# Patient Record
Sex: MALE | Race: WHITE | NOT HISPANIC OR LATINO | Employment: OTHER | URBAN - METROPOLITAN AREA
[De-identification: names, ages, dates, MRNs, and addresses within clinical notes are randomized per-mention and may not be internally consistent; named-entity substitution may affect disease eponyms.]

---

## 2017-06-04 ENCOUNTER — ANESTHESIA EVENT (OUTPATIENT)
Dept: GASTROENTEROLOGY | Facility: HOSPITAL | Age: 67
End: 2017-06-04

## 2017-06-05 ENCOUNTER — ANESTHESIA (OUTPATIENT)
Dept: GASTROENTEROLOGY | Facility: HOSPITAL | Age: 67
End: 2017-06-05

## 2017-07-10 ENCOUNTER — ANESTHESIA EVENT (OUTPATIENT)
Dept: GASTROENTEROLOGY | Facility: HOSPITAL | Age: 67
End: 2017-07-10
Payer: COMMERCIAL

## 2017-07-11 ENCOUNTER — ANESTHESIA (OUTPATIENT)
Dept: GASTROENTEROLOGY | Facility: HOSPITAL | Age: 67
End: 2017-07-11
Payer: COMMERCIAL

## 2017-07-11 ENCOUNTER — HOSPITAL ENCOUNTER (OUTPATIENT)
Facility: HOSPITAL | Age: 67
Setting detail: OUTPATIENT SURGERY
Discharge: HOME | End: 2017-07-11
Attending: INTERNAL MEDICINE | Admitting: INTERNAL MEDICINE
Payer: COMMERCIAL

## 2017-07-11 VITALS
RESPIRATION RATE: 20 BRPM | DIASTOLIC BLOOD PRESSURE: 72 MMHG | OXYGEN SATURATION: 96 % | SYSTOLIC BLOOD PRESSURE: 117 MMHG | BODY MASS INDEX: 29.23 KG/M2 | HEIGHT: 63 IN | HEART RATE: 76 BPM | TEMPERATURE: 96.6 F | WEIGHT: 165 LBS

## 2017-07-11 DIAGNOSIS — K22.70 BARRETT'S ESOPHAGUS WITHOUT DYSPLASIA: ICD-10-CM

## 2017-07-11 DIAGNOSIS — K21.9 GASTRO-ESOPHAGEAL REFLUX DISEASE WITHOUT ESOPHAGITIS: ICD-10-CM

## 2017-07-11 PROCEDURE — 88305 TISSUE EXAM BY PATHOLOGIST: CPT | Performed by: INTERNAL MEDICINE

## 2017-07-11 RX ORDER — SODIUM CHLORIDE 9 MG/ML
125 INJECTION, SOLUTION INTRAVENOUS CONTINUOUS
Status: DISCONTINUED | OUTPATIENT
Start: 2017-07-11 | End: 2017-07-11 | Stop reason: HOSPADM

## 2017-07-11 RX ORDER — PROPOFOL 10 MG/ML
INJECTION, EMULSION INTRAVENOUS AS NEEDED
Status: DISCONTINUED | OUTPATIENT
Start: 2017-07-11 | End: 2017-07-11 | Stop reason: SURG

## 2017-07-11 RX ADMIN — PROPOFOL 20 MG: 10 INJECTION, EMULSION INTRAVENOUS at 14:08

## 2017-07-11 RX ADMIN — PROPOFOL 50 MG: 10 INJECTION, EMULSION INTRAVENOUS at 14:07

## 2017-07-11 RX ADMIN — SODIUM CHLORIDE 125 ML/HR: 0.9 INJECTION, SOLUTION INTRAVENOUS at 13:56

## 2017-07-11 RX ADMIN — PROPOFOL 100 MG: 10 INJECTION, EMULSION INTRAVENOUS at 14:05

## 2017-07-11 RX ADMIN — PROPOFOL 10 MG: 10 INJECTION, EMULSION INTRAVENOUS at 14:09

## 2017-07-11 RX ADMIN — PROPOFOL 10 MG: 10 INJECTION, EMULSION INTRAVENOUS at 14:12

## 2019-06-10 ENCOUNTER — ANESTHESIA EVENT (OUTPATIENT)
Dept: GASTROENTEROLOGY | Facility: HOSPITAL | Age: 69
End: 2019-06-10

## 2019-06-11 ENCOUNTER — ANESTHESIA (OUTPATIENT)
Dept: GASTROENTEROLOGY | Facility: HOSPITAL | Age: 69
End: 2019-06-11

## 2019-06-11 ENCOUNTER — HOSPITAL ENCOUNTER (OUTPATIENT)
Dept: GASTROENTEROLOGY | Facility: HOSPITAL | Age: 69
Setting detail: OUTPATIENT SURGERY
Discharge: HOME | End: 2019-06-11
Attending: INTERNAL MEDICINE | Admitting: INTERNAL MEDICINE
Payer: MEDICARE

## 2019-06-11 VITALS
DIASTOLIC BLOOD PRESSURE: 58 MMHG | BODY MASS INDEX: 25.52 KG/M2 | HEIGHT: 63 IN | TEMPERATURE: 97.6 F | RESPIRATION RATE: 13 BRPM | HEART RATE: 45 BPM | SYSTOLIC BLOOD PRESSURE: 101 MMHG | OXYGEN SATURATION: 98 % | WEIGHT: 144 LBS

## 2019-06-11 DIAGNOSIS — F41.1 GENERALIZED ANXIETY DISORDER: ICD-10-CM

## 2019-06-11 DIAGNOSIS — K22.70 BARRETT'S ESOPHAGUS WITHOUT DYSPLASIA: ICD-10-CM

## 2019-06-11 DIAGNOSIS — K44.9 DIAPHRAGMATIC HERNIA WITHOUT OBSTRUCTION OR GANGRENE: ICD-10-CM

## 2019-06-11 DIAGNOSIS — R13.10 DYSPHAGIA: ICD-10-CM

## 2019-06-11 PROCEDURE — 88305 TISSUE EXAM BY PATHOLOGIST: CPT | Performed by: PATHOLOGY

## 2019-06-11 PROCEDURE — 88342 IMHCHEM/IMCYTCHM 1ST ANTB: CPT | Performed by: PATHOLOGY

## 2019-06-11 RX ORDER — SODIUM CHLORIDE 9 MG/ML
125 INJECTION, SOLUTION INTRAVENOUS CONTINUOUS
Status: DISCONTINUED | OUTPATIENT
Start: 2019-06-11 | End: 2019-06-15 | Stop reason: HOSPADM

## 2019-06-11 RX ORDER — PROPOFOL 10 MG/ML
INJECTION, EMULSION INTRAVENOUS AS NEEDED
Status: DISCONTINUED | OUTPATIENT
Start: 2019-06-11 | End: 2019-06-11 | Stop reason: SURG

## 2019-06-11 RX ADMIN — PROPOFOL 100 MG: 10 INJECTION, EMULSION INTRAVENOUS at 14:08

## 2019-06-11 RX ADMIN — PROPOFOL 30 MG: 10 INJECTION, EMULSION INTRAVENOUS at 14:13

## 2019-06-11 RX ADMIN — SODIUM CHLORIDE 125 ML/HR: 0.9 INJECTION, SOLUTION INTRAVENOUS at 13:39

## 2019-06-11 RX ADMIN — PROPOFOL 50 MG: 10 INJECTION, EMULSION INTRAVENOUS at 14:09

## 2019-06-11 RX ADMIN — LIDOCAINE HYDROCHLORIDE 100 MG: 20 INJECTION, SOLUTION INTRAVENOUS at 14:08

## 2019-06-11 RX ADMIN — PROPOFOL 20 MG: 10 INJECTION, EMULSION INTRAVENOUS at 14:11

## 2023-03-16 RX ORDER — SODIUM CHLORIDE 9 MG/ML
125 INJECTION, SOLUTION INTRAVENOUS CONTINUOUS
Status: CANCELLED | OUTPATIENT
Start: 2023-03-16

## 2023-03-17 ENCOUNTER — ANESTHESIA (OUTPATIENT)
Dept: GASTROENTEROLOGY | Facility: HOSPITAL | Age: 73
End: 2023-03-17

## 2023-03-17 ENCOUNTER — ANESTHESIA EVENT (OUTPATIENT)
Dept: GASTROENTEROLOGY | Facility: HOSPITAL | Age: 73
End: 2023-03-17

## 2023-03-17 ENCOUNTER — HOSPITAL ENCOUNTER (OUTPATIENT)
Dept: GASTROENTEROLOGY | Facility: HOSPITAL | Age: 73
Setting detail: OUTPATIENT SURGERY
End: 2023-03-17
Attending: INTERNAL MEDICINE

## 2023-03-17 VITALS
WEIGHT: 177 LBS | TEMPERATURE: 97.8 F | SYSTOLIC BLOOD PRESSURE: 106 MMHG | HEIGHT: 63 IN | DIASTOLIC BLOOD PRESSURE: 68 MMHG | OXYGEN SATURATION: 96 % | BODY MASS INDEX: 31.36 KG/M2 | RESPIRATION RATE: 15 BRPM | HEART RATE: 75 BPM

## 2023-03-17 DIAGNOSIS — K22.70 BARRETT'S ESOPHAGUS WITHOUT DYSPLASIA: ICD-10-CM

## 2023-03-17 DIAGNOSIS — K21.9 GASTROESOPHAGEAL REFLUX DISEASE WITHOUT ESOPHAGITIS: ICD-10-CM

## 2023-03-17 LAB — GLUCOSE SERPL-MCNC: 157 MG/DL (ref 65–140)

## 2023-03-17 RX ORDER — LIDOCAINE HYDROCHLORIDE 20 MG/ML
INJECTION, SOLUTION EPIDURAL; INFILTRATION; INTRACAUDAL; PERINEURAL AS NEEDED
Status: DISCONTINUED | OUTPATIENT
Start: 2023-03-17 | End: 2023-03-17

## 2023-03-17 RX ORDER — PROPOFOL 10 MG/ML
INJECTION, EMULSION INTRAVENOUS AS NEEDED
Status: DISCONTINUED | OUTPATIENT
Start: 2023-03-17 | End: 2023-03-17

## 2023-03-17 RX ORDER — SODIUM CHLORIDE 9 MG/ML
125 INJECTION, SOLUTION INTRAVENOUS CONTINUOUS
Status: DISCONTINUED | OUTPATIENT
Start: 2023-03-17 | End: 2023-03-21 | Stop reason: HOSPADM

## 2023-03-17 RX ORDER — GLYCOPYRROLATE 0.2 MG/ML
INJECTION INTRAMUSCULAR; INTRAVENOUS AS NEEDED
Status: DISCONTINUED | OUTPATIENT
Start: 2023-03-17 | End: 2023-03-17

## 2023-03-17 RX ORDER — SODIUM CHLORIDE 9 MG/ML
INJECTION, SOLUTION INTRAVENOUS CONTINUOUS PRN
Status: DISCONTINUED | OUTPATIENT
Start: 2023-03-17 | End: 2023-03-17

## 2023-03-17 RX ADMIN — PROPOFOL 120 MG: 10 INJECTION, EMULSION INTRAVENOUS at 08:08

## 2023-03-17 RX ADMIN — SODIUM CHLORIDE: 0.9 INJECTION, SOLUTION INTRAVENOUS at 07:55

## 2023-03-17 RX ADMIN — SODIUM CHLORIDE 125 ML/HR: 0.9 INJECTION, SOLUTION INTRAVENOUS at 08:01

## 2023-03-17 RX ADMIN — PROPOFOL 30 MG: 10 INJECTION, EMULSION INTRAVENOUS at 08:13

## 2023-03-17 RX ADMIN — GLYCOPYRROLATE 0.2 MG: 0.2 INJECTION INTRAMUSCULAR; INTRAVENOUS at 08:06

## 2023-03-17 RX ADMIN — LIDOCAINE HYDROCHLORIDE 60 MG: 20 INJECTION, SOLUTION EPIDURAL; INFILTRATION; INTRACAUDAL; PERINEURAL at 08:08

## 2023-03-17 RX ADMIN — PROPOFOL 50 MG: 10 INJECTION, EMULSION INTRAVENOUS at 08:10

## 2023-03-17 NOTE — ANESTHESIA PREPROCEDURE EVALUATION
Procedure:  EGD    Relevant Problems   GI/HEPATIC   (+) Dysphagia   (+) GERD (gastroesophageal reflux disease)      Digestive   (+) Barretts esophagus        Physical Exam    Airway    Mallampati score: II  TM Distance: >3 FB  Neck ROM: full     Dental   No notable dental hx     Cardiovascular  Rhythm: regular, Rate: normal, Cardiovascular exam normal    Pulmonary  Pulmonary exam normal Breath sounds clear to auscultation,     Other Findings        Anesthesia Plan  ASA Score- 2     Anesthesia Type- IV sedation with anesthesia with ASA Monitors  Additional Monitors:   Airway Plan:     Comment: GA prn  Plan Factors-    Patient summary reviewed  Patient is not a current smoker  Patient not instructed to abstain from smoking on day of procedure  Patient did not smoke on day of surgery  There is medical exclusion for perioperative obstructive sleep apnea risk education  Induction- intravenous  Postoperative Plan-     Informed Consent- Anesthetic plan and risks discussed with patient and spouse Yonas Sifuentes)  I personally reviewed this patient with the CRNA  Discussed and agreed on the Anesthesia Plan with the CRNA  Adryan Verma

## 2023-03-17 NOTE — DISCHARGE INSTR - AVS FIRST PAGE
Please call [phone] with any problems  Continue on your acid suppression  We will call you with hopefully benign pathology  I have suggested repeat EGD in 1 year for your Markham's

## 2023-03-17 NOTE — ANESTHESIA POSTPROCEDURE EVALUATION
Post-Op Assessment Note    CV Status:  Stable    Pain management: adequate     Mental Status:  Alert and awake   Hydration Status:  Euvolemic   PONV Controlled:  Controlled   Airway Patency:  Patent      Post Op Vitals Reviewed: Yes      Staff: Anesthesiologist, CRNA         No notable events documented      BP      Temp      Pulse     Resp      SpO2      /68   Pulse 75   Temp 97 8 °F (36 6 °C) (Temporal)   Resp 15   Ht 5' 3" (1 6 m)   Wt 80 3 kg (177 lb)   SpO2 96%   BMI 31 35 kg/m²

## 2023-03-17 NOTE — INTERVAL H&P NOTE
H&P reviewed  After examining the patient I find no changes in the patients condition since the H&P had been written    No dysphagia  Vitals:    03/17/23 0757   BP: 132/78   Pulse: 57   Resp: 16   Temp: 97 8 °F (36 6 °C)   SpO2: 97%

## 2024-10-15 PROBLEM — N40.1 BENIGN PROSTATIC HYPERPLASIA WITH URINARY FREQUENCY: Status: ACTIVE | Noted: 2020-11-25

## 2024-10-15 PROBLEM — Z86.11 HISTORY OF TB (TUBERCULOSIS): Status: ACTIVE | Noted: 2024-10-15

## 2024-10-15 PROBLEM — I70.0 HARDENING OF THE AORTA (MAIN ARTERY OF THE HEART) (HCC): Status: ACTIVE | Noted: 2023-10-11

## 2024-10-15 PROBLEM — R35.0 BENIGN PROSTATIC HYPERPLASIA WITH URINARY FREQUENCY: Status: ACTIVE | Noted: 2020-11-25

## 2024-10-15 PROBLEM — K76.0 FATTY LIVER: Status: ACTIVE | Noted: 2018-03-20

## 2024-10-15 PROBLEM — E11.9 CONTROLLED TYPE 2 DIABETES MELLITUS WITHOUT COMPLICATION, WITHOUT LONG-TERM CURRENT USE OF INSULIN (HCC): Status: ACTIVE | Noted: 2022-04-07

## 2024-10-15 PROBLEM — E04.1 NONTOXIC UNINODULAR GOITER: Status: ACTIVE | Noted: 2019-10-31

## 2024-11-20 RX ORDER — SODIUM CHLORIDE, SODIUM LACTATE, POTASSIUM CHLORIDE, CALCIUM CHLORIDE 600; 310; 30; 20 MG/100ML; MG/100ML; MG/100ML; MG/100ML
125 INJECTION, SOLUTION INTRAVENOUS CONTINUOUS
Status: CANCELLED | OUTPATIENT
Start: 2024-11-20

## 2024-11-21 ENCOUNTER — ANESTHESIA (OUTPATIENT)
Dept: GASTROENTEROLOGY | Facility: HOSPITAL | Age: 74
End: 2024-11-21
Payer: MEDICARE

## 2024-11-21 ENCOUNTER — ANESTHESIA EVENT (OUTPATIENT)
Dept: GASTROENTEROLOGY | Facility: HOSPITAL | Age: 74
End: 2024-11-21
Payer: MEDICARE

## 2024-11-21 ENCOUNTER — HOSPITAL ENCOUNTER (OUTPATIENT)
Dept: GASTROENTEROLOGY | Facility: HOSPITAL | Age: 74
Setting detail: OUTPATIENT SURGERY
End: 2024-11-21
Attending: INTERNAL MEDICINE
Payer: MEDICARE

## 2024-11-21 VITALS
HEIGHT: 63 IN | OXYGEN SATURATION: 95 % | BODY MASS INDEX: 31.01 KG/M2 | SYSTOLIC BLOOD PRESSURE: 133 MMHG | RESPIRATION RATE: 20 BRPM | DIASTOLIC BLOOD PRESSURE: 87 MMHG | WEIGHT: 175 LBS | TEMPERATURE: 97.4 F | HEART RATE: 59 BPM

## 2024-11-21 DIAGNOSIS — K22.70 LONG-SEGMENT BARRETT'S ESOPHAGUS: ICD-10-CM

## 2024-11-21 DIAGNOSIS — E11.9 CONTROLLED TYPE 2 DIABETES MELLITUS WITHOUT COMPLICATION, WITHOUT LONG-TERM CURRENT USE OF INSULIN (HCC): ICD-10-CM

## 2024-11-21 DIAGNOSIS — K21.9 GASTROESOPHAGEAL REFLUX DISEASE, UNSPECIFIED WHETHER ESOPHAGITIS PRESENT: ICD-10-CM

## 2024-11-21 DIAGNOSIS — K44.9 HIATAL HERNIA: ICD-10-CM

## 2024-11-21 LAB — GLUCOSE SERPL-MCNC: 161 MG/DL (ref 65–140)

## 2024-11-21 PROCEDURE — 88305 TISSUE EXAM BY PATHOLOGIST: CPT | Performed by: PATHOLOGY

## 2024-11-21 PROCEDURE — 88360 TUMOR IMMUNOHISTOCHEM/MANUAL: CPT | Performed by: PATHOLOGY

## 2024-11-21 PROCEDURE — 82948 REAGENT STRIP/BLOOD GLUCOSE: CPT

## 2024-11-21 PROCEDURE — 88342 IMHCHEM/IMCYTCHM 1ST ANTB: CPT | Performed by: PATHOLOGY

## 2024-11-21 RX ORDER — SODIUM CHLORIDE, SODIUM LACTATE, POTASSIUM CHLORIDE, CALCIUM CHLORIDE 600; 310; 30; 20 MG/100ML; MG/100ML; MG/100ML; MG/100ML
125 INJECTION, SOLUTION INTRAVENOUS CONTINUOUS
Status: DISPENSED | OUTPATIENT
Start: 2024-11-21

## 2024-11-21 RX ORDER — LIDOCAINE HYDROCHLORIDE 10 MG/ML
INJECTION, SOLUTION EPIDURAL; INFILTRATION; INTRACAUDAL; PERINEURAL AS NEEDED
Status: DISCONTINUED | OUTPATIENT
Start: 2024-11-21 | End: 2024-11-21

## 2024-11-21 RX ORDER — PROPOFOL 10 MG/ML
INJECTION, EMULSION INTRAVENOUS AS NEEDED
Status: DISCONTINUED | OUTPATIENT
Start: 2024-11-21 | End: 2024-11-21

## 2024-11-21 RX ADMIN — PROPOFOL 100 MG: 10 INJECTION, EMULSION INTRAVENOUS at 09:29

## 2024-11-21 RX ADMIN — PROPOFOL 20 MG: 10 INJECTION, EMULSION INTRAVENOUS at 09:30

## 2024-11-21 RX ADMIN — PROPOFOL 20 MG: 10 INJECTION, EMULSION INTRAVENOUS at 09:34

## 2024-11-21 RX ADMIN — LIDOCAINE HYDROCHLORIDE 100 MG: 10 SOLUTION INTRAVENOUS at 09:28

## 2024-11-21 RX ADMIN — SODIUM CHLORIDE, SODIUM LACTATE, POTASSIUM CHLORIDE, AND CALCIUM CHLORIDE 125 ML/HR: .6; .31; .03; .02 INJECTION, SOLUTION INTRAVENOUS at 08:41

## 2024-11-21 RX ADMIN — PROPOFOL 20 MG: 10 INJECTION, EMULSION INTRAVENOUS at 09:32

## 2024-11-21 NOTE — DISCHARGE INSTR - AVS FIRST PAGE
Please call [phone] with any problems.  I did do multiple biopsies.  There was nothing suspicious seen.  I would suggest screening in 2 years for your Markham's.  Please continue your PPI

## 2024-11-21 NOTE — ANESTHESIA POSTPROCEDURE EVALUATION
Post-Op Assessment Note    CV Status:  Stable  Pain Score: 0    Pain management: adequate       Mental Status:  Alert and awake   Hydration Status:  Euvolemic   PONV Controlled:  Controlled   Airway Patency:  Patent     Post Op Vitals Reviewed: Yes    No anethesia notable event occurred.    Staff: CRNA           Last Filed PACU Vitals:  Vitals Value Taken Time   Temp 97.4 °F (36.3 °C) 11/21/24 0944   Pulse 60 11/21/24 0944   /79 11/21/24 0944   Resp 20 11/21/24 0944   SpO2 94 % 11/21/24 0944       Modified Golden:  No data recorded

## 2024-11-21 NOTE — INTERVAL H&P NOTE
H&P reviewed. After examining the patient I find no changes in the patients condition since the H&P had been written.    Vitals:    11/21/24 0844   BP: 146/86   Pulse:    Resp:    Temp:    SpO2:

## 2024-11-21 NOTE — ANESTHESIA PREPROCEDURE EVALUATION
Procedure:  EGD    Relevant Problems   CARDIO   (+) Cerebral atherosclerosis   (+) Hardening of the aorta (main artery of the heart) (HCC)   (+) Hyperlipidemia      ENDO   (+) Controlled type 2 diabetes mellitus without complication, without long-term current use of insulin (HCC)      GI/HEPATIC   (+) Dysphagia   (+) Fatty liver   (+) GERD (gastroesophageal reflux disease)      Endocrine   (+) Nontoxic uninodular goiter      FEN/Gastrointestinal   (+) Barretts esophagus      Other   (+) Benign prostatic hyperplasia with urinary frequency   (+) History of TB (tuberculosis)        Physical Exam    Airway    Mallampati score: II  TM Distance: <3 FB  Neck ROM: full     Dental   No notable dental hx     Cardiovascular  Cardiovascular exam normal    Pulmonary  Pulmonary exam normal     Other Findings        Anesthesia Plan  ASA Score- 2     Anesthesia Type- IV sedation with anesthesia with ASA Monitors.         Additional Monitors:     Airway Plan:            Plan Factors-Exercise tolerance (METS): >4 METS.    Chart reviewed.   Existing labs reviewed.     Patient is not a current smoker. Patient not instructed to abstain from smoking on day of procedure. Patient did not smoke on day of surgery.            Induction- intravenous.    Postoperative Plan-         Informed Consent- Anesthetic plan and risks discussed with patient.  I personally reviewed this patient with the CRNA. Discussed and agreed on the Anesthesia Plan with the CRNA..

## 2024-11-26 PROCEDURE — 88360 TUMOR IMMUNOHISTOCHEM/MANUAL: CPT | Performed by: PATHOLOGY

## 2024-11-26 PROCEDURE — 88305 TISSUE EXAM BY PATHOLOGIST: CPT | Performed by: PATHOLOGY

## 2024-11-26 PROCEDURE — 88342 IMHCHEM/IMCYTCHM 1ST ANTB: CPT | Performed by: PATHOLOGY

## 2025-02-27 ENCOUNTER — NEW PATIENT (OUTPATIENT)
Dept: URBAN - METROPOLITAN AREA CLINIC 6 | Facility: CLINIC | Age: 75
End: 2025-02-27

## 2025-02-27 DIAGNOSIS — H52.203: ICD-10-CM

## 2025-02-27 DIAGNOSIS — H52.13: ICD-10-CM

## 2025-02-27 DIAGNOSIS — E11.9: ICD-10-CM

## 2025-02-27 DIAGNOSIS — H25.813: ICD-10-CM

## 2025-02-27 DIAGNOSIS — H52.4: ICD-10-CM

## 2025-02-27 DIAGNOSIS — H53.022: ICD-10-CM

## 2025-02-27 PROCEDURE — 92004 COMPRE OPH EXAM NEW PT 1/>: CPT

## 2025-02-27 PROCEDURE — 92015 DETERMINE REFRACTIVE STATE: CPT

## 2025-02-27 ASSESSMENT — VISUAL ACUITY
OS_CC: 20/150
OD_CC: 20/25
OS_PH: 20/100

## 2025-02-27 ASSESSMENT — TONOMETRY
OS_IOP_MMHG: 12
OD_IOP_MMHG: 14

## 2025-04-14 ENCOUNTER — CONSULT (OUTPATIENT)
Dept: NEPHROLOGY | Facility: CLINIC | Age: 75
End: 2025-04-14
Payer: MEDICARE

## 2025-04-14 VITALS
BODY MASS INDEX: 31.01 KG/M2 | SYSTOLIC BLOOD PRESSURE: 128 MMHG | WEIGHT: 175 LBS | DIASTOLIC BLOOD PRESSURE: 80 MMHG | HEIGHT: 63 IN | OXYGEN SATURATION: 95 % | HEART RATE: 74 BPM

## 2025-04-14 DIAGNOSIS — E83.9 CHRONIC KIDNEY DISEASE-MINERAL AND BONE DISORDER: ICD-10-CM

## 2025-04-14 DIAGNOSIS — N18.31 STAGE 3A CHRONIC KIDNEY DISEASE (HCC): Primary | ICD-10-CM

## 2025-04-14 DIAGNOSIS — N18.9 CHRONIC KIDNEY DISEASE-MINERAL AND BONE DISORDER: ICD-10-CM

## 2025-04-14 DIAGNOSIS — N18.2 TYPE 2 DIABETES MELLITUS WITH STAGE 2 CHRONIC KIDNEY DISEASE, WITHOUT LONG-TERM CURRENT USE OF INSULIN  (HCC): ICD-10-CM

## 2025-04-14 DIAGNOSIS — E11.22 TYPE 2 DIABETES MELLITUS WITH STAGE 2 CHRONIC KIDNEY DISEASE, WITHOUT LONG-TERM CURRENT USE OF INSULIN  (HCC): ICD-10-CM

## 2025-04-14 DIAGNOSIS — M89.9 CHRONIC KIDNEY DISEASE-MINERAL AND BONE DISORDER: ICD-10-CM

## 2025-04-14 PROCEDURE — 99204 OFFICE O/P NEW MOD 45 MIN: CPT | Performed by: INTERNAL MEDICINE

## 2025-04-14 RX ORDER — LISINOPRIL 2.5 MG/1
2.5 TABLET ORAL DAILY
COMMUNITY

## 2025-04-14 NOTE — PROGRESS NOTES
NEPHROLOGY OUTPATIENT CONSULTATION   Rodolfo Mendez 74 y.o. male MRN: [id number]  Date: 04/14/25  Reason for consultation: Initial evaluation of CKD    ASSESSMENT and PLAN:  Chronic kidney disease, stage III  Baseline creatinine was around 1.1-1.2.    Since starting lisinopril in April 2024, Rodolfo's creatinine has been around 1.3. The slight rise in the creatinine is likely due to the hemodynamic effect of the lisinopril.  He has no proteinuria which bodes for a good renal prognosis.  Given his history of diabetes, I recommend staying on lisinopril 2.5 mg daily.  To complete the workup, we will check a renal ultrasound, UA, and urine ACR.   He has a history of transient hypercalcemia on labs. Check SPEP, UPEP, and free light chains for completeness.     Mineral and bone disease:  Check PTH, Vitamin D and phos.     Diabetes mellitus  DM is being managed by PCP.  He is on metformin.     Patient Instructions   Your creatinine indicates that you have either stage II or stage III kidney disease.   However, your kidney function has been relatively stable since 2022.   Please check a kidney ultrasound.   I recommend no changes to your medications today.   The Lisinopril is good for kidney protection.   Follow up in 6 months - please obtain blood work 1-2 weeks prior to the appointment.   Please avoid taking NSAIDs (Ibuprofen, Motrin, Aleve, Advil, Naproxen, Celebrex, etc.)  Make sure you are eating healthy and have adequate exercise.     HISTORY OF PRESENT ILLNESS:  Requesting Physician: Elizabeth Son MD    Rodolfo Mendez is a 74 y.o. male who was referred for initial evaluation of an elevated creatinine.    Rodolfo has a history of diabetes mellitus, hyperlipidemia, Markham's esophagus, osteoarthritis, BPH, thyroid nodules and a positive PPD test.  He denies a history of hypertension or cardiac issues.  He is being referred for evaluation of an elevated creatinine.    Based on my review of the records, I note that Rodolfo's  "creatinine is anywhere between 1.0 and 1.4 based on blood work from March 2018 to March 2025.  His creatinine had been around 1.0-1.2 for the most part prior to July 2022. It seems that he was started on Losartan 25 mg daily in April 2022 because of an abnormal stress test. His creatinine in July and Sep 2022 were 1.39 and 1.32, respectively.  He self discontinued the losartan 1 year after starting it and his creatinine in 2023 to April 2024 was between 1.12 and 1.19.  He was started on lisinopril 2.5 mg daily for renal protection during a regular office visit with his PCP in April 2024.  Routine labs in February 2025 revealed a creatinine of 1.36 and repeat labs in March 2025 revealed a creatinine 1.38.  Due to the elevated creatinine, a renal consultation was requested.    His BP control has been at goal.   No history of gross hematuria.   He recalls taking pain medications (does not know the name) about 3 times a week for a few years before having bilateral knee replacements in 2009.     PAST MEDICAL HISTORY:  DM: Diagnosed about >3 years ago.  He is currently on metformin.  He is not insulin dependent. No known diabetic retinopathy.   HLP: Not on statins.   Markham's esophagus: He has been on a PPI for roughly 20 years.  Osteoarthritis: s/p bilateral knee replacement, right hip replacement.  BPH  Thyroid nodules.   PPD positive: Never required TB treatment.     No known history of DM, CAD, CHF, CVA, PAD, COPD, SIERRA, asthma, thromboembolism, liver disease, nephrolithiasis, malignancy, psychiatric disease.    PAST SURGICAL HISTORY:  Bilateral knee replacement.   R hip replacement.   3 rotator cuff surgery  Bilateral carpal tunnel release.  Sebaceous cyst excision    ALLERGIES:  Allergies   Allergen Reactions    Amoxicillin Other (See Comments)     Joint pain  \"knee joint pain\"     SOCIAL HISTORY:  Non smoker  Social alcohol use - wine twice a month.   No IVDA.     FAMILY HISTORY:  Negative for CKD or ESRD. "     MEDICATIONS:    Current Outpatient Medications:     Cholecalciferol (VITAMIN D-3 PO), Take 125 mcg by mouth in the morning, Disp: , Rfl:     clindamycin (CLEOCIN) 300 MG capsule, AS NEEDED FOR DENTAL PROCEDURES, Disp: , Rfl:     lisinopril (ZESTRIL) 2.5 mg tablet, Take 2.5 mg by mouth daily, Disp: , Rfl:     metFORMIN (GLUCOPHAGE) 500 mg tablet, Take 500 mg by mouth 2 (two) times a day with meals, Disp: , Rfl:     multivitamin (THERAGRAN) TABS, Take 1 tablet by mouth daily., Disp: , Rfl:     pantoprazole (PROTONIX) 20 mg tablet, Take 1 tablet (20 mg total) by mouth 2 (two) times a day, Disp: 180 tablet, Rfl: 1    tadalafil (CIALIS) 5 MG tablet, Take 5 mg by mouth daily, Disp: , Rfl:     atorvastatin (LIPITOR) 40 mg tablet, Take 40 mg by mouth every evening (Patient not taking: Reported on 10/15/2024), Disp: , Rfl:     losartan (COZAAR) 25 mg tablet, Take 25 mg by mouth daily (Patient not taking: Reported on 10/15/2024), Disp: , Rfl:     REVIEW OF SYSTEMS:  Review of Systems   Constitutional:  Positive for fatigue. Negative for appetite change, chills and fever.   HENT:  Negative for hearing loss, sinus pressure and sinus pain.    Eyes:  Positive for visual disturbance.   Respiratory:  Negative for cough and shortness of breath.    Cardiovascular:  Negative for chest pain and leg swelling.   Gastrointestinal:  Positive for diarrhea (occasional - twice a month.). Negative for abdominal pain, nausea and vomiting.   Genitourinary:  Negative for difficulty urinating, dysuria and hematuria.   Musculoskeletal:  Positive for arthralgias and back pain (on and off depending on activity.).   Skin:  Negative for rash.   Allergic/Immunologic: Negative for immunocompromised state.   Neurological:  Negative for dizziness and light-headedness.   Hematological:  Does not bruise/bleed easily.   Psychiatric/Behavioral:  Negative for dysphoric mood. The patient is not nervous/anxious.    All the systems were reviewed and were  "negative except as documented on the HPI.    PHYSICAL EXAMINATION:  /80 (BP Location: Left arm, Patient Position: Sitting, Cuff Size: Standard)   Pulse 74   Ht 5' 3\" (1.6 m)   Wt 79.4 kg (175 lb)   SpO2 95%   BMI 31.00 kg/m²   Current Weight: Weight - Scale: 79.4 kg (175 lb) Body mass index is 31 kg/m².  General: conscious, coherent, cooperative, not in distress.   Skin: warm, dry, good turgor.   Eyes: pink conjunctivae, no scleral icterus.   ENT: moist lips and mucous membranes.   Respiratory: equal chest expansion, clear breath sounds.   Cardiovascular: distinct heart sounds, normal rate, regular rhythm, no rub  Abdomen: soft, non-tender, non-distended, normoactive bowel sounds  Extremities: no edema.  Genitourinary: no esparza catheter.   Neuro: awake, alert, oriented to time, place and person.   Psych: appropriate affect.     LABORATORY RESULTS:  Lab Results   Component Value Date    SODIUM 141 03/07/2025    K 4.8 03/07/2025     03/07/2025    CO2 26 03/07/2025    BUN 20 03/07/2025    CREATININE 1.38 (H) 03/07/2025    GLUC 121 (H) 03/07/2025    CALCIUM 10.2 03/07/2025     IMAGING: none.   "

## 2025-04-14 NOTE — PATIENT INSTRUCTIONS
Your creatinine indicates that you have either stage II or stage III kidney disease.   However, your kidney function has been relatively stable since 2022.   Please check a kidney ultrasound.   I recommend no changes to your medications today.   The Lisinopril is good for kidney protection.   Follow up in 6 months - please obtain blood work 1-2 weeks prior to the appointment.   Please avoid taking NSAIDs (Ibuprofen, Motrin, Aleve, Advil, Naproxen, Celebrex, etc.)  Make sure you are eating healthy and have adequate exercise.

## 2025-04-30 ENCOUNTER — TELEPHONE (OUTPATIENT)
Dept: NEPHROLOGY | Facility: CLINIC | Age: 75
End: 2025-04-30

## 2025-04-30 NOTE — TELEPHONE ENCOUNTER
I called the patient and left a message on voicemail.   Recent renal US reviewed.     No hydronephrosis  Mildly complex bilateral renal cysts  PVR 34 cc  Prominent prostate    Plan:  Repeat renal US to monitor renal cysts in 12 months.   Will order repeat renal US during next office visit.

## 2025-06-05 ENCOUNTER — ANESTHESIA EVENT (OUTPATIENT)
Dept: GASTROENTEROLOGY | Facility: HOSPITAL | Age: 75
End: 2025-06-05
Payer: MEDICARE

## 2025-06-05 ENCOUNTER — ANESTHESIA (OUTPATIENT)
Dept: GASTROENTEROLOGY | Facility: HOSPITAL | Age: 75
End: 2025-06-05
Payer: MEDICARE

## 2025-06-05 ENCOUNTER — HOSPITAL ENCOUNTER (OUTPATIENT)
Dept: GASTROENTEROLOGY | Facility: HOSPITAL | Age: 75
Setting detail: OUTPATIENT SURGERY
End: 2025-06-05
Attending: INTERNAL MEDICINE
Payer: MEDICARE

## 2025-06-05 VITALS
WEIGHT: 175 LBS | TEMPERATURE: 97.9 F | RESPIRATION RATE: 18 BRPM | OXYGEN SATURATION: 99 % | BODY MASS INDEX: 31.01 KG/M2 | HEART RATE: 75 BPM | DIASTOLIC BLOOD PRESSURE: 73 MMHG | HEIGHT: 63 IN | SYSTOLIC BLOOD PRESSURE: 114 MMHG

## 2025-06-05 DIAGNOSIS — G47.33 OSA (OBSTRUCTIVE SLEEP APNEA): ICD-10-CM

## 2025-06-05 DIAGNOSIS — E11.9 TYPE 2 DIABETES MELLITUS WITHOUT COMPLICATION, WITHOUT LONG-TERM CURRENT USE OF INSULIN (HCC): ICD-10-CM

## 2025-06-05 DIAGNOSIS — K21.9 GASTROESOPHAGEAL REFLUX DISEASE, UNSPECIFIED WHETHER ESOPHAGITIS PRESENT: ICD-10-CM

## 2025-06-05 DIAGNOSIS — K22.70: ICD-10-CM

## 2025-06-05 PROBLEM — M16.11 LOCALIZED OSTEOARTHROSIS OF RIGHT HIP: Status: ACTIVE | Noted: 2020-08-27

## 2025-06-05 PROBLEM — M17.10 OSTEOARTHRITIS, LOCALIZED, KNEE: Status: ACTIVE | Noted: 2020-08-27

## 2025-06-05 PROBLEM — M20.019 MALLET FINGER: Status: ACTIVE | Noted: 2025-06-05

## 2025-06-05 PROBLEM — M75.122 COMPLETE TEAR OF LEFT ROTATOR CUFF: Status: ACTIVE | Noted: 2020-08-27

## 2025-06-05 PROBLEM — R39.15 URINARY URGENCY: Status: ACTIVE | Noted: 2020-11-25

## 2025-06-05 PROBLEM — S62.639A: Status: ACTIVE | Noted: 2025-06-05

## 2025-06-05 PROBLEM — L30.1 DYSHIDROTIC ECZEMA: Status: ACTIVE | Noted: 2020-11-25

## 2025-06-05 PROBLEM — E55.9 VITAMIN D DEFICIENCY: Status: ACTIVE | Noted: 2020-11-25

## 2025-06-05 PROBLEM — M25.519 CHRONIC SHOULDER PAIN: Status: ACTIVE | Noted: 2021-03-18

## 2025-06-05 PROBLEM — K44.9 HIATAL HERNIA: Status: ACTIVE | Noted: 2020-08-27

## 2025-06-05 PROBLEM — M54.2 CERVICAL PAIN (NECK): Status: ACTIVE | Noted: 2020-08-27

## 2025-06-05 PROBLEM — M20.019: Status: ACTIVE | Noted: 2025-06-05

## 2025-06-05 PROBLEM — G89.29 CHRONIC SHOULDER PAIN: Status: ACTIVE | Noted: 2021-03-18

## 2025-06-05 PROBLEM — Z96.641 STATUS POST TOTAL REPLACEMENT OF RIGHT HIP: Status: ACTIVE | Noted: 2020-08-27

## 2025-06-05 PROBLEM — Z96.653 HISTORY OF BILATERAL KNEE REPLACEMENT: Status: ACTIVE | Noted: 2022-08-16

## 2025-06-05 LAB — GLUCOSE SERPL-MCNC: 153 MG/DL (ref 65–140)

## 2025-06-05 PROCEDURE — 88305 TISSUE EXAM BY PATHOLOGIST: CPT | Performed by: PATHOLOGY

## 2025-06-05 PROCEDURE — 82948 REAGENT STRIP/BLOOD GLUCOSE: CPT

## 2025-06-05 RX ORDER — METHOCARBAMOL 500 MG/1
500 TABLET, FILM COATED ORAL AS NEEDED
COMMUNITY

## 2025-06-05 RX ORDER — SODIUM CHLORIDE, SODIUM LACTATE, POTASSIUM CHLORIDE, CALCIUM CHLORIDE 600; 310; 30; 20 MG/100ML; MG/100ML; MG/100ML; MG/100ML
INJECTION, SOLUTION INTRAVENOUS CONTINUOUS PRN
Status: DISCONTINUED | OUTPATIENT
Start: 2025-06-05 | End: 2025-06-05

## 2025-06-05 RX ORDER — SODIUM CHLORIDE, SODIUM LACTATE, POTASSIUM CHLORIDE, CALCIUM CHLORIDE 600; 310; 30; 20 MG/100ML; MG/100ML; MG/100ML; MG/100ML
125 INJECTION, SOLUTION INTRAVENOUS CONTINUOUS
Status: DISCONTINUED | OUTPATIENT
Start: 2025-06-05 | End: 2025-06-09 | Stop reason: HOSPADM

## 2025-06-05 RX ORDER — PROPOFOL 10 MG/ML
INJECTION, EMULSION INTRAVENOUS AS NEEDED
Status: DISCONTINUED | OUTPATIENT
Start: 2025-06-05 | End: 2025-06-05

## 2025-06-05 RX ORDER — SODIUM CHLORIDE, SODIUM LACTATE, POTASSIUM CHLORIDE, CALCIUM CHLORIDE 600; 310; 30; 20 MG/100ML; MG/100ML; MG/100ML; MG/100ML
75 INJECTION, SOLUTION INTRAVENOUS CONTINUOUS
Status: CANCELLED | OUTPATIENT
Start: 2025-06-05

## 2025-06-05 RX ORDER — LIDOCAINE HYDROCHLORIDE 10 MG/ML
INJECTION, SOLUTION EPIDURAL; INFILTRATION; INTRACAUDAL; PERINEURAL AS NEEDED
Status: DISCONTINUED | OUTPATIENT
Start: 2025-06-05 | End: 2025-06-05

## 2025-06-05 RX ADMIN — PROPOFOL 120 MG: 10 INJECTION, EMULSION INTRAVENOUS at 09:13

## 2025-06-05 RX ADMIN — PROPOFOL 40 MG: 10 INJECTION, EMULSION INTRAVENOUS at 09:16

## 2025-06-05 RX ADMIN — LIDOCAINE HYDROCHLORIDE 50 MG: 10 SOLUTION INTRAVENOUS at 09:13

## 2025-06-05 RX ADMIN — SODIUM CHLORIDE, SODIUM LACTATE, POTASSIUM CHLORIDE, AND CALCIUM CHLORIDE 125 ML/HR: .6; .31; .03; .02 INJECTION, SOLUTION INTRAVENOUS at 08:46

## 2025-06-05 RX ADMIN — SODIUM CHLORIDE, SODIUM LACTATE, POTASSIUM CHLORIDE, AND CALCIUM CHLORIDE: .6; .31; .03; .02 INJECTION, SOLUTION INTRAVENOUS at 08:43

## 2025-06-05 NOTE — ANESTHESIA POSTPROCEDURE EVALUATION
Post-Op Assessment Note    CV Status:  Stable  Pain Score: 0    Pain management: adequate       Mental Status:  Alert and awake   Hydration Status:  Stable   PONV Controlled:  None   Airway Patency:  Patent     Post Op Vitals Reviewed: Yes    No anethesia notable event occurred.    Staff: CRNA           Last Filed PACU Vitals:  Vitals Value Taken Time   Temp     Pulse 72    /74    Resp 16    SpO2 96

## 2025-06-05 NOTE — ANESTHESIA PREPROCEDURE EVALUATION
Procedure:  EGD    Relevant Problems   CARDIO   (+) Cerebral atherosclerosis   (+) Hardening of the aorta (main artery of the heart) (HCC)   (+) Hyperlipidemia      ENDO   (+) Type 2 diabetes mellitus with stage 2 chronic kidney disease, without long-term current use of insulin  (HCC)      GI/HEPATIC   (+) Dysphagia   (+) Fatty liver   (+) GERD (gastroesophageal reflux disease)   (+) Hiatal hernia      /RENAL   (+) Chronic kidney disease-mineral and bone disorder   (+) Stage 3a chronic kidney disease (HCC)      MUSCULOSKELETAL   (+) Localized osteoarthrosis of right hip   (+) Osteoarthritis, localized, knee        Physical Exam    Airway     Mallampati score: II  TM Distance: <3 FB  Neck ROM: full  Upper bite lip test: I  Mouth opening: >= 4 cm      Cardiovascular  Cardiovascular exam normal    Dental   No notable dental hx     Pulmonary  Pulmonary exam normal     Neurological  - normal exam    Other Findings        Anesthesia Plan  ASA Score- 2     Anesthesia Type- IV sedation with anesthesia with ASA Monitors.         Additional Monitors:     Airway Plan:     Comment: ECHO 2D STRESS EXERCISE  Order: [id number]  Narrative    ·  Significant Findings: [place].  ·  Stress ECG: The ECG was positive for ischemia. ST depression in the  inferior leads and in V5 was noted (1.5- 2mm) that improved in recovery.  There were no arrhythmias during stress. There were no arrhythmias during  recovery.  ·  Study Impression: There is no echocardiographic evidence of ischemia.  ·  Stress Findings: Overall, the patient's exercise capacity was normal  for their age. The patient reached stage 3 of the protocol after  exercising for 8 min and 59 sec. The patient had a maximal HR of 142 bpm  (94 % of MPHR) 10.3 METS. The patient reported no symptoms during the  stress test. Blood pressure demonstrated a normal response and heart rate  demonstrated a normal response to stress. The patient's heart rate  recovery was normal.    Left  Ventricle  Left ventricle is normal in size. Systolic function is normal with an ejection fraction of 60-65%.    Mitral Valve  There is trace regurgitation.    Tricuspid Valve  There is mild regurgitation.    Aortic Valve  The aortic valve is trileaflet.    Noncardiac  Significant Findings: [place] Act 112.    Resting ECG  ECG is normal. Resting ECG shows no ST-segment deviation. The ECG shows normal sinus rhythm.    Stress Findings  A Nickolas protocol stress test was performed. Overall, the patient's exercise capacity was normal for their age. The patient reached stage 3 of the protocol after exercising for 8 min and 59 sec. The patient had a maximal HR of 142 bpm (94 % of MPHR) 10.3 METS. The patient experienced no angina during the test. The test was stopped because the patient experienced fatigue. The patient reported no symptoms during the stress test. Blood pressure demonstrated a normal response and heart rate demonstrated a normal response to stress. The patient's heart rate recovery was normal.    Stress ECG  ST depression in the inferior leads and in V5 was noted ( 1.5- 2mm) ST depression ends in recovery . There were no arrhythmias during stress. There were no arrhythmias during recovery. The ECG was positive for ischemia.    Echo Post Stress  Left ventricle cavity becomes smaller with exercise. The left ventricle systolic function is hyperdynamic post-stress. The post-stress echo showed normal wall motion.    Study Impression  The study is negative and shows no echocardiographic evidence of ischemia.  .       Plan Factors-Exercise tolerance (METS): >4 METS.    Chart reviewed. EKG reviewed.  Existing labs reviewed.     Patient is not a current smoker. Patient not instructed to abstain from smoking on day of procedure. Patient did not smoke on day of surgery.            Induction- intravenous.    Postoperative Plan- .   Monitoring Plan - Monitoring plan - standard ASA monitoring  Post Operative Pain Plan -  non-opiod analgesics        Informed Consent- Anesthetic plan and risks discussed with patient.  I personally reviewed this patient with the CRNA. Discussed and agreed on the Anesthesia Plan with the CRNA..      NPO Status:  No vitals data found for the desired time range.

## 2025-06-05 NOTE — INTERVAL H&P NOTE
H&P reviewed. After examining the patient I find no changes in the patients condition since the H&P had been written.    Vitals:    06/05/25 0838   BP: 133/81   Pulse: 74   Resp: 16   Temp: (!) 97.2 °F (36.2 °C)   SpO2: 94%

## 2025-06-05 NOTE — DISCHARGE INSTR - AVS FIRST PAGE
Please call [phone] with any problems.  Did do multiple biopsies of your Markham's.  No associated lesions were seen.  I have asked my office to contact you to come back to go over pathology and tentative EGD recall in 1 year.

## 2025-06-09 PROCEDURE — 88305 TISSUE EXAM BY PATHOLOGIST: CPT | Performed by: PATHOLOGY

## (undated) DEVICE — SINGLE-USE BIOPSY FORCEPS: Brand: RADIAL JAW 4